# Patient Record
Sex: MALE | Race: WHITE | Employment: FULL TIME | URBAN - METROPOLITAN AREA
[De-identification: names, ages, dates, MRNs, and addresses within clinical notes are randomized per-mention and may not be internally consistent; named-entity substitution may affect disease eponyms.]

---

## 2024-04-11 ENCOUNTER — TRANSCRIBE ORDERS (OUTPATIENT)
Dept: ADMINISTRATIVE | Age: 24
End: 2024-04-11

## 2024-04-11 DIAGNOSIS — E83.52 HYPERCALCEMIA SYNDROME: Primary | ICD-10-CM

## 2024-04-18 ENCOUNTER — HOSPITAL ENCOUNTER (OUTPATIENT)
Dept: NON INVASIVE DIAGNOSTICS | Age: 24
Discharge: HOME | End: 2024-04-18
Payer: COMMERCIAL

## 2024-04-18 DIAGNOSIS — E83.52 HYPERCALCEMIA SYNDROME: ICD-10-CM

## 2024-04-18 PROCEDURE — 93005 ELECTROCARDIOGRAM TRACING: CPT | Performed by: NURSE PRACTITIONER

## 2024-04-19 ENCOUNTER — OFFICE VISIT (OUTPATIENT)
Dept: ENDOCRINOLOGY | Age: 24
End: 2024-04-19
Payer: COMMERCIAL

## 2024-04-19 VITALS
BODY MASS INDEX: 32.95 KG/M2 | HEIGHT: 75 IN | OXYGEN SATURATION: 99 % | SYSTOLIC BLOOD PRESSURE: 144 MMHG | WEIGHT: 265 LBS | HEART RATE: 81 BPM | DIASTOLIC BLOOD PRESSURE: 79 MMHG

## 2024-04-19 DIAGNOSIS — E83.52 HYPERCALCEMIA: ICD-10-CM

## 2024-04-19 DIAGNOSIS — R53.83 OTHER FATIGUE: ICD-10-CM

## 2024-04-19 DIAGNOSIS — E83.52 HYPERCALCEMIA: Primary | ICD-10-CM

## 2024-04-19 LAB
ANION GAP SERPL CALCULATED.3IONS-SCNC: 14 MEQ/L (ref 9–15)
BUN SERPL-MCNC: 13 MG/DL (ref 6–20)
CALCIUM SERPL-MCNC: 9.8 MG/DL (ref 8.5–9.9)
CHLORIDE SERPL-SCNC: 102 MEQ/L (ref 95–107)
CO2 SERPL-SCNC: 27 MEQ/L (ref 20–31)
CREAT SERPL-MCNC: 0.87 MG/DL (ref 0.7–1.2)
EKG ATRIAL RATE: 70 BPM
EKG P AXIS: 45 DEGREES
EKG P-R INTERVAL: 164 MS
EKG Q-T INTERVAL: 388 MS
EKG QRS DURATION: 124 MS
EKG QTC CALCULATION (BAZETT): 419 MS
EKG R AXIS: 45 DEGREES
EKG T AXIS: 29 DEGREES
EKG VENTRICULAR RATE: 70 BPM
GLUCOSE SERPL-MCNC: 83 MG/DL (ref 70–99)
POTASSIUM SERPL-SCNC: 4 MEQ/L (ref 3.4–4.9)
SODIUM SERPL-SCNC: 143 MEQ/L (ref 135–144)

## 2024-04-19 PROCEDURE — 99203 OFFICE O/P NEW LOW 30 MIN: CPT | Performed by: INTERNAL MEDICINE

## 2024-04-19 NOTE — PROGRESS NOTES
results found for: \"TSH\", \"TSHREFLEX\", \"FT3\", \"T4FREE\"  No results found for: \"TPOABS\"    Review of Systems   Constitutional:  Positive for fatigue.   Cardiovascular: Negative.    Gastrointestinal:  Negative for abdominal pain.   Musculoskeletal:  Negative for joint swelling and myalgias.   Neurological: Negative.        Objective:   Physical Exam  Vitals reviewed.   Constitutional:       General: He is not in acute distress.     Appearance: Normal appearance. He is obese.   HENT:      Head: Normocephalic and atraumatic.      Right Ear: External ear normal.      Left Ear: External ear normal.      Nose: Nose normal.   Eyes:      General: No scleral icterus.        Right eye: No discharge.         Left eye: No discharge.      Extraocular Movements: Extraocular movements intact.      Conjunctiva/sclera: Conjunctivae normal.   Cardiovascular:      Rate and Rhythm: Normal rate.   Pulmonary:      Effort: Pulmonary effort is normal.   Abdominal:      Palpations: Abdomen is soft.   Musculoskeletal:         General: Normal range of motion.      Cervical back: Normal range of motion and neck supple.   Neurological:      General: No focal deficit present.      Mental Status: He is alert and oriented to person, place, and time.   Psychiatric:         Mood and Affect: Mood normal.         Behavior: Behavior normal.

## 2024-04-20 LAB
SHBG SERPL-SCNC: 16 NMOL/L (ref 17–56)
TESTOST FREE SERPL-MCNC: 82.5 PG/ML (ref 47–244)
TESTOST SERPL-MCNC: 296 NG/DL (ref 249–836)

## 2024-04-21 LAB
MISCELLANEOUS LAB TEST ORDER: NORMAL
WHOPPER PROMPT: NORMAL

## 2024-04-22 DIAGNOSIS — E83.52 HYPERCALCEMIA: ICD-10-CM

## 2024-04-22 LAB
CALCIUM 24H UR-MRATE: 140 MG/TV (ref 42–353)
CREAT 24H UR-MRATE: 3.51 G/TV (ref 1.5–2)
CREAT UR-MCNC: 125.5 MG/DL
SPECIMEN VOL 24H UR: 2800 ML

## 2024-05-01 ENCOUNTER — OFFICE VISIT (OUTPATIENT)
Dept: CARDIOLOGY CLINIC | Age: 24
End: 2024-05-01
Payer: COMMERCIAL

## 2024-05-01 VITALS
RESPIRATION RATE: 15 BRPM | DIASTOLIC BLOOD PRESSURE: 76 MMHG | BODY MASS INDEX: 34.57 KG/M2 | OXYGEN SATURATION: 98 % | SYSTOLIC BLOOD PRESSURE: 136 MMHG | HEART RATE: 64 BPM | HEIGHT: 75 IN | WEIGHT: 278 LBS

## 2024-05-01 DIAGNOSIS — I15.9 SECONDARY HYPERTENSION: Primary | ICD-10-CM

## 2024-05-01 PROCEDURE — 99204 OFFICE O/P NEW MOD 45 MIN: CPT | Performed by: INTERNAL MEDICINE

## 2024-05-01 RX ORDER — LOSARTAN POTASSIUM 25 MG/1
25 TABLET ORAL DAILY
Qty: 30 TABLET | Refills: 5 | Status: SHIPPED | OUTPATIENT
Start: 2024-05-01

## 2024-05-01 RX ORDER — CLONIDINE HYDROCHLORIDE 0.1 MG/1
0.1 TABLET ORAL 2 TIMES DAILY PRN
COMMUNITY
Start: 2024-04-27 | End: 2024-05-01

## 2024-05-01 ASSESSMENT — ENCOUNTER SYMPTOMS
DIARRHEA: 0
CHEST TIGHTNESS: 0
ABDOMINAL PAIN: 0
COLOR CHANGE: 0
NAUSEA: 0
CONSTIPATION: 0
EYE REDNESS: 0
SHORTNESS OF BREATH: 0
APNEA: 0
RHINORRHEA: 0
VOMITING: 0
COUGH: 0
WHEEZING: 0

## 2024-05-01 NOTE — PROGRESS NOTES
right bundle  I have a suspicion that it could be related to lung disease  Patient smokes  I encourage patient to quit smoking  I would like to obtain an echocardiogram  Exercise stress test  Patient reports that he had a lipid panel at [facility].  I recommend patient to bring those results to me  If not we can reorder a lipid panel    Hypertension  I would like to stop clonidine  I would like to start losartan 25 mg daily    Smoker  Have encouraged patient to quit smoking    Return to clinic after echocardiogram and exercise stress test is done    On this date 5/1/2024 I have spent >30minutes reviewing previous notes, test results and face to face with the patient discussing the diagnoses and importance of compliance with the treatment plan as well as documenting on the day of the visit.            This note was transcribed using voice recognition software.  Every effort was made to ensure accuracy; however, inadvertent computerized transcription errors may be present.

## 2024-06-07 ENCOUNTER — HOSPITAL ENCOUNTER (OUTPATIENT)
Age: 24
End: 2024-06-07
Attending: INTERNAL MEDICINE
Payer: COMMERCIAL

## 2024-06-07 VITALS
BODY MASS INDEX: 32.95 KG/M2 | WEIGHT: 264.99 LBS | DIASTOLIC BLOOD PRESSURE: 76 MMHG | HEIGHT: 75 IN | SYSTOLIC BLOOD PRESSURE: 136 MMHG

## 2024-06-07 DIAGNOSIS — I15.9 SECONDARY HYPERTENSION: ICD-10-CM

## 2024-06-07 PROCEDURE — 93306 TTE W/DOPPLER COMPLETE: CPT

## 2024-06-07 PROCEDURE — 93017 CV STRESS TEST TRACING ONLY: CPT

## 2024-06-09 LAB
ECHO AO ROOT DIAM: 2.9 CM
ECHO AO ROOT INDEX: 1.17 CM/M2
ECHO AV AREA PEAK VELOCITY: 3.2 CM2
ECHO AV AREA VTI: 3.2 CM2
ECHO AV AREA/BSA PEAK VELOCITY: 1.3 CM2/M2
ECHO AV AREA/BSA VTI: 1.3 CM2/M2
ECHO AV CUSP MM: 2.9 CM
ECHO AV MEAN GRADIENT: 4 MMHG
ECHO AV MEAN VELOCITY: 0.9 M/S
ECHO AV PEAK GRADIENT: 7 MMHG
ECHO AV PEAK VELOCITY: 1.4 M/S
ECHO AV VELOCITY RATIO: 0.64
ECHO AV VTI: 26.8 CM
ECHO BSA: 2.52 M2
ECHO EST RA PRESSURE: 3 MMHG
ECHO LA DIAMETER INDEX: 1.62 CM/M2
ECHO LA DIAMETER: 4 CM
ECHO LA TO AORTIC ROOT RATIO: 1.38
ECHO LA VOL A-L A2C: 59 ML (ref 18–58)
ECHO LA VOL A-L A4C: 49 ML (ref 18–58)
ECHO LA VOL MOD A2C: 54 ML (ref 18–58)
ECHO LA VOL MOD A4C: 47 ML (ref 18–58)
ECHO LA VOLUME AREA LENGTH: 57 ML
ECHO LA VOLUME INDEX A-L A2C: 24 ML/M2 (ref 16–34)
ECHO LA VOLUME INDEX A-L A4C: 20 ML/M2 (ref 16–34)
ECHO LA VOLUME INDEX AREA LENGTH: 23 ML/M2 (ref 16–34)
ECHO LA VOLUME INDEX MOD A2C: 22 ML/M2 (ref 16–34)
ECHO LA VOLUME INDEX MOD A4C: 19 ML/M2 (ref 16–34)
ECHO LV E' LATERAL VELOCITY: 18 CM/S
ECHO LV E' SEPTAL VELOCITY: 13 CM/S
ECHO LV EDV A2C: 142 ML
ECHO LV EDV A4C: 119 ML
ECHO LV EDV BP: 132 ML (ref 67–155)
ECHO LV EDV INDEX A4C: 48 ML/M2
ECHO LV EDV INDEX BP: 53 ML/M2
ECHO LV EDV NDEX A2C: 57 ML/M2
ECHO LV EJECTION FRACTION A2C: 52 %
ECHO LV EJECTION FRACTION A4C: 42 %
ECHO LV EJECTION FRACTION BIPLANE: 46 % (ref 55–100)
ECHO LV ESV A2C: 68 ML
ECHO LV ESV A4C: 69 ML
ECHO LV ESV BP: 71 ML (ref 22–58)
ECHO LV ESV INDEX A2C: 28 ML/M2
ECHO LV ESV INDEX A4C: 28 ML/M2
ECHO LV ESV INDEX BP: 29 ML/M2
ECHO LV FRACTIONAL SHORTENING: 31 % (ref 28–44)
ECHO LV INTERNAL DIMENSION DIASTOLE INDEX: 2.11 CM/M2
ECHO LV INTERNAL DIMENSION DIASTOLIC: 5.2 CM (ref 4.2–5.9)
ECHO LV INTERNAL DIMENSION SYSTOLIC INDEX: 1.46 CM/M2
ECHO LV INTERNAL DIMENSION SYSTOLIC: 3.6 CM
ECHO LV IVSD: 1.3 CM (ref 0.6–1)
ECHO LV IVSS: 1.6 CM
ECHO LV MASS 2D: 248.8 G (ref 88–224)
ECHO LV MASS INDEX 2D: 100.7 G/M2 (ref 49–115)
ECHO LV POSTERIOR WALL DIASTOLIC: 1.1 CM (ref 0.6–1)
ECHO LV POSTERIOR WALL SYSTOLIC: 1.9 CM
ECHO LV RELATIVE WALL THICKNESS RATIO: 0.42
ECHO LVOT AREA: 4.9 CM2
ECHO LVOT AV VTI INDEX: 0.66
ECHO LVOT DIAM: 2.5 CM
ECHO LVOT MEAN GRADIENT: 2 MMHG
ECHO LVOT PEAK GRADIENT: 3 MMHG
ECHO LVOT PEAK VELOCITY: 0.9 M/S
ECHO LVOT STROKE VOLUME INDEX: 35 ML/M2
ECHO LVOT SV: 86.4 ML
ECHO LVOT VTI: 17.6 CM
ECHO MV A VELOCITY: 0.67 M/S
ECHO MV E DECELERATION TIME (DT): 105.9 MS
ECHO MV E VELOCITY: 0.76 M/S
ECHO MV E/A RATIO: 1.13
ECHO MV E/E' LATERAL: 4.22
ECHO MV E/E' RATIO (AVERAGED): 5.03
ECHO MV E/E' SEPTAL: 5.85
ECHO PV MAX VELOCITY: 1.1 M/S
ECHO PV PEAK GRADIENT: 5 MMHG
ECHO RIGHT VENTRICULAR SYSTOLIC PRESSURE (RVSP): 22 MMHG
ECHO RV INTERNAL DIMENSION: 3.6 CM
ECHO RV TAPSE: 1.8 CM (ref 1.7–?)
ECHO RVOT PEAK GRADIENT: 4 MMHG
ECHO RVOT PEAK VELOCITY: 1 M/S
ECHO TV REGURGITANT MAX VELOCITY: 2.17 M/S
ECHO TV REGURGITANT PEAK GRADIENT: 19 MMHG
STRESS BASELINE DIAS BP: 77 MMHG
STRESS BASELINE HR: 76 BPM
STRESS BASELINE ST DEPRESSION: 0 MM
STRESS BASELINE SYS BP: 133 MMHG
STRESS ESTIMATED WORKLOAD: 14.8 METS
STRESS EXERCISE DUR MIN: 12 MIN
STRESS EXERCISE DUR SEC: 14 SEC
STRESS PEAK DIAS BP: 85 MMHG
STRESS PEAK SYS BP: 182 MMHG
STRESS PERCENT HR ACHIEVED: 87 %
STRESS POST PEAK HR: 171 BPM
STRESS RATE PRESSURE PRODUCT: NORMAL BPM*MMHG
STRESS ST DEPRESSION: 0 MM
STRESS TARGET HR: 197 BPM

## 2024-06-21 ENCOUNTER — OFFICE VISIT (OUTPATIENT)
Dept: BARIATRICS/WEIGHT MGMT | Age: 24
End: 2024-06-21
Payer: COMMERCIAL

## 2024-06-21 VITALS
OXYGEN SATURATION: 98 % | HEART RATE: 68 BPM | BODY MASS INDEX: 34.38 KG/M2 | WEIGHT: 259.4 LBS | DIASTOLIC BLOOD PRESSURE: 76 MMHG | SYSTOLIC BLOOD PRESSURE: 132 MMHG | HEIGHT: 73 IN

## 2024-06-21 DIAGNOSIS — E66.01 SEVERE OBESITY (HCC): Primary | ICD-10-CM

## 2024-06-21 DIAGNOSIS — I15.8 OTHER SECONDARY HYPERTENSION: ICD-10-CM

## 2024-06-21 PROCEDURE — 99205 OFFICE O/P NEW HI 60 MIN: CPT | Performed by: SURGERY

## 2024-06-21 RX ORDER — TOPIRAMATE 50 MG/1
50 TABLET, FILM COATED ORAL 2 TIMES DAILY
Qty: 60 TABLET | Refills: 3 | Status: SHIPPED | OUTPATIENT
Start: 2024-06-21

## 2024-06-21 NOTE — PROGRESS NOTES
with no rubs clicks or murmurs.  Abdomen: Obese, nontender.  No fluid shift or caput medusae.  No hepatosplenomegaly.  Extremities: No clubbing cyanosis or edema.  Neurologic: No gross motor or sensory defects.    Labs Reviewed:  Lab Results   Component Value Date/Time     04/19/2024 03:13 PM    K 4.0 04/19/2024 03:13 PM     04/19/2024 03:13 PM    CO2 27 04/19/2024 03:13 PM     No results found for: \"HGB\", \"HCT\"  No results found for: \"INR\"  Radiology Reviewed:  [unfilled]     Diagnosis:      1. Severe obesity (HCC)    2. Other secondary hypertension        Assessment/Plan:            Rene Gardner  is a 24 y.o. male with   1. Severe obesity (HCC)    2. Other secondary hypertension        We discussed the disease of obesity and the biologic mechanisms of ghrelin and basal metabolic rate in regards to resistance to weight loss and as a cause of weight re-gain.  We discussed exercise as a method of increasing basal metabolic rate, growth hormone production,  and skeletal muscle consumption of body fat as fuel.     I am prescribing Topamax for weight loss.  We discussed the risks of Topamax:    Tiredness, drowsiness, dizziness, loss of coordination, tingling of the hands/feet, loss of appetite, bad taste in your mouth, and diarrhea may occur.  severe side effects that could lead to long-term problems and permanent damage from Topamax are possible. For example, Topamax may cause eye side effects, such as glaucoma. And if untreated, glaucoma could cause blindness.     We discussed the association of sleep apnea with high cortisol levels, obesity, and the associated resistance to weight loss.  I am referring to Sleep Medicine to assess and treat for possible sleep apnea.      I am referring to our Dietician for education regarding  nutrition as it relates to weight management.    We will follow-up with referrals to our bariatric dietitian regarding a long-term diet plan.  We will follow up to assess weight in 3

## 2024-06-26 ENCOUNTER — TELEPHONE (OUTPATIENT)
Dept: BARIATRICS/WEIGHT MGMT | Age: 24
End: 2024-06-26

## 2024-06-26 NOTE — TELEPHONE ENCOUNTER
Patient called stating he stopped taking the Topamax as ordered by Dr Gentile (50 mg twice a day). He states that the medication is interfering with his job performance at work. He states that the medication is causing a decrease in concentration, forgetfulness, and it is taking longer than normal to perform job duties. He states that the medication is also causing mood swings. Informed patient that Dr Gentile will be notified of his concern/complaints. Office will contact him with instructions.

## 2024-07-01 DIAGNOSIS — E66.3 OVER WEIGHT: Primary | ICD-10-CM

## 2024-07-01 RX ORDER — SEMAGLUTIDE 1.34 MG/ML
1 INJECTION, SOLUTION SUBCUTANEOUS
Qty: 4 ADJUSTABLE DOSE PRE-FILLED PEN SYRINGE | Refills: 2 | Status: SHIPPED | OUTPATIENT
Start: 2024-07-01

## 2024-07-01 NOTE — PROGRESS NOTES
Exercise induced laryngeal obstruction   Referral to speech therapy     Follow up as needed Ozempic ordered per patient request.    Honorio Gentile MD, FACS, [facility]

## 2024-07-03 DIAGNOSIS — E66.3 OVER WEIGHT: Primary | ICD-10-CM

## 2024-07-03 RX ORDER — TIRZEPATIDE 2.5 MG/.5ML
1 INJECTION, SOLUTION SUBCUTANEOUS WEEKLY
Qty: 0.5 ML | Refills: 2 | Status: SHIPPED | OUTPATIENT
Start: 2024-07-03

## 2024-07-03 NOTE — PROGRESS NOTES
Zepbound ordered per patient request.    In the initial appointment, we  discussed the risks and benefits of synthetic GLP-I therapy.  The risks include:    * increased risk for thyroid cancer (actual risk not established in human trials)  Inflammation of the pancreas (pancreatitis).  Low blood sugar (hypoglycemia). ...  Serious allergic reactions.  Kidney problems (kidney failure).  Severe stomach problems.  Changes in vision.  Gallbladder problems.  Gastroparesis, possibly permanent         Honorio Gentile MD, FACS, [facility]

## 2024-07-05 ENCOUNTER — OFFICE VISIT (OUTPATIENT)
Dept: BARIATRICS/WEIGHT MGMT | Age: 24
End: 2024-07-05
Payer: COMMERCIAL

## 2024-07-05 VITALS — WEIGHT: 261.4 LBS | BODY MASS INDEX: 34.64 KG/M2 | HEIGHT: 73 IN

## 2024-07-05 DIAGNOSIS — E66.01 SEVERE OBESITY (HCC): ICD-10-CM

## 2024-07-05 DIAGNOSIS — I15.8 OTHER SECONDARY HYPERTENSION: Primary | ICD-10-CM

## 2024-07-05 DIAGNOSIS — Z71.3 DIETARY COUNSELING AND SURVEILLANCE: ICD-10-CM

## 2024-07-05 LAB
ESTIMATED AVERAGE GLUCOSE: 114 MG/DL
HBA1C MFR BLD: 5.6 % (ref 4–6)
TSH REFLEX: 1.17 UIU/ML (ref 0.44–3.86)

## 2024-07-05 PROCEDURE — 97802 MEDICAL NUTRITION INDIV IN: CPT | Performed by: DIETITIAN, REGISTERED

## 2024-07-05 NOTE — PROGRESS NOTES
does not have night eating. Patient does have a history of emotional eating or eating out of boredom.    It does not take an unusually large amount of food for the patient to feel comfortably full.     Current exercise: no structured exercise, works active job, 9573-5540 steps/day  Limitations to exercise: non    Assessment  Nutritional Needs: Deshawn Moreno = 2073 kcal x 1.3 (activity factor)= 2694 kcal - 500-1000 calories/day  (for 1-2 lb weight loss/week)= 7934-1891 calories per day  Protein needs: 100-125  grams per day (1.2-1.5gr/kg IBW)    PES Statement:  Overweight/Obesity related to a complex combination of decreased energy needs, disordered eating patterns, physical inactivity, and increased psychological/life stress as evidenced by BMI greater than 30 and inability to maintain a significant amount of weight loss through conventional weight loss interventions.    Plan/Recommendations:   Track daily intake: 4076-1511 kcal/day  Limit sodium <2500 mg/day  Increase water 64 oz/day  Increase steps to 10,000/day, add strength training 2-3 days/week    Education materials provided:   Hypertension Nutrition Therapy    Follow up visit: 4 weeks with dietitian.     Total time involved in direct patient education: 45 minutes    Valarie Oliver RD

## 2024-07-09 DIAGNOSIS — E66.9 OBESITY, CLASS I, BMI 30.0-34.9 (SEE ACTUAL BMI): Primary | ICD-10-CM

## 2024-07-09 NOTE — TELEPHONE ENCOUNTER
Patient requesting a prescription for Wegovy.  Zepbound was denied by insurance.  Patient states that he spoke with his insurance and Wegovy does not have any requirements like Type 2 DM or 6 month weight management program.  Patient is aware that insurance still may not cover the medication.  Please approve or deny this request.    Medication pending at lowest dose.    Rx requested:  Requested Prescriptions     Pending Prescriptions Disp Refills    Semaglutide-Weight Management (WEGOVY) 0.25 MG/0.5ML SOAJ SC injection 2 mL 2     Sig: Inject 0.25 mg into the skin every 7 days         Last Office Visit:   6/21/2024      Next Visit Date:  Future Appointments   Date Time Provider Department Center   7/19/2024  2:30 PM Alhaji Lind MD [facility]   8/2/2024 11:00 AM Ariel Gordon MD [facility]   9/20/2024 10:00 AM Valarie Oliver RD [facility]   9/20/2024 10:45 AM Nicholas Gentile MD [facility]

## 2024-07-10 RX ORDER — TIRZEPATIDE 2.5 MG/.5ML
0.5 INJECTION, SOLUTION SUBCUTANEOUS WEEKLY
Qty: 4 ML | Refills: 0 | Status: SHIPPED | OUTPATIENT
Start: 2024-07-10 | End: 2024-08-29

## 2024-07-12 ENCOUNTER — PATIENT MESSAGE (OUTPATIENT)
Dept: BARIATRICS/WEIGHT MGMT | Age: 24
End: 2024-07-12

## 2024-07-12 NOTE — TELEPHONE ENCOUNTER
I called and spoke with Ashley ([facility] [phone]) and faxed the additional information to [phone] as advised.

## 2024-07-12 NOTE — TELEPHONE ENCOUNTER
From: Rene Gardner  To: Dr. Nicholas Gentile  Sent: 7/12/2024 11:53 AM EDT  Subject: Pre-authorization documents     I spoke with [facility] in regards to the Wegovy denial, and their pre-authorization team told me that a gym membership spanning over the 6months can count towards a self assigned weight loss program, Attached is documentation of my gym membership spanning over 11months.

## 2024-07-18 RX ORDER — SEMAGLUTIDE 0.25 MG/.5ML
0.25 INJECTION, SOLUTION SUBCUTANEOUS
Qty: 2 ML | Refills: 1 | Status: SHIPPED | OUTPATIENT
Start: 2024-07-18 | End: 2024-09-16

## 2024-07-18 NOTE — PROGRESS NOTES
We discussed the risks and benefits of synthetic GLP-I therapy.  The risks include:    * increased risk for thyroid cancer (actual risk not established in human trials)  Inflammation of the pancreas (pancreatitis).  Low blood sugar (hypoglycemia). ...  Serious allergic reactions.  Kidney problems (kidney failure).  Severe stomach problems.  Changes in vision.  Gallbladder problems.    We discussed not starting therapy with a synthetic GLP-1 to assess the effectiveness of what are considered first line therapies like Topamax and metformin.  We also discussed that we have no long-term data on success rates with synthetic GLP-1 use, and the value of reserving the meds for later if needed.    Honorio Gentile MD, FACS, [facility]

## 2024-08-05 RX ORDER — SEMAGLUTIDE 0.5 MG/.5ML
0.5 INJECTION, SOLUTION SUBCUTANEOUS
Qty: 2 ML | Refills: 0 | Status: SHIPPED | OUTPATIENT
Start: 2024-08-05

## 2024-09-06 RX ORDER — SEMAGLUTIDE 1 MG/.5ML
1 INJECTION, SOLUTION SUBCUTANEOUS
Qty: 2 ML | Refills: 1 | Status: SHIPPED | OUTPATIENT
Start: 2024-09-06

## 2024-09-09 RX ORDER — LOSARTAN POTASSIUM 25 MG/1
25 TABLET ORAL DAILY
Qty: 90 TABLET | Refills: 2 | Status: SHIPPED | OUTPATIENT
Start: 2024-09-09